# Patient Record
Sex: MALE | Race: WHITE | NOT HISPANIC OR LATINO | URBAN - METROPOLITAN AREA
[De-identification: names, ages, dates, MRNs, and addresses within clinical notes are randomized per-mention and may not be internally consistent; named-entity substitution may affect disease eponyms.]

---

## 2017-10-26 PROBLEM — Z00.00 ENCOUNTER FOR PREVENTIVE HEALTH EXAMINATION: Status: ACTIVE | Noted: 2017-10-26

## 2020-06-12 ENCOUNTER — EMERGENCY (EMERGENCY)
Facility: HOSPITAL | Age: 75
LOS: 1 days | Discharge: HOME | End: 2020-06-12
Attending: EMERGENCY MEDICINE | Admitting: EMERGENCY MEDICINE
Payer: MEDICARE

## 2020-06-12 ENCOUNTER — EMERGENCY (EMERGENCY)
Facility: HOSPITAL | Age: 75
LOS: 1 days | Discharge: HOME | End: 2020-06-12
Attending: INTERNAL MEDICINE | Admitting: INTERNAL MEDICINE
Payer: MEDICARE

## 2020-06-12 VITALS
DIASTOLIC BLOOD PRESSURE: 87 MMHG | RESPIRATION RATE: 18 BRPM | SYSTOLIC BLOOD PRESSURE: 136 MMHG | TEMPERATURE: 98 F | HEART RATE: 88 BPM | OXYGEN SATURATION: 97 % | WEIGHT: 218.04 LBS | HEIGHT: 72 IN

## 2020-06-12 VITALS
HEIGHT: 72 IN | WEIGHT: 218.04 LBS | SYSTOLIC BLOOD PRESSURE: 134 MMHG | HEART RATE: 81 BPM | DIASTOLIC BLOOD PRESSURE: 86 MMHG | TEMPERATURE: 99 F | OXYGEN SATURATION: 96 % | RESPIRATION RATE: 18 BRPM

## 2020-06-12 DIAGNOSIS — R33.9 RETENTION OF URINE, UNSPECIFIED: ICD-10-CM

## 2020-06-12 LAB
APPEARANCE UR: CLEAR — SIGNIFICANT CHANGE UP
BACTERIA # UR AUTO: NEGATIVE /HPF — SIGNIFICANT CHANGE UP
BILIRUB UR-MCNC: NEGATIVE — SIGNIFICANT CHANGE UP
COLOR SPEC: YELLOW — SIGNIFICANT CHANGE UP
DIFF PNL FLD: ABNORMAL
EPI CELLS # UR: SIGNIFICANT CHANGE UP
GLUCOSE UR QL: NEGATIVE — SIGNIFICANT CHANGE UP
KETONES UR-MCNC: NEGATIVE — SIGNIFICANT CHANGE UP
LEUKOCYTE ESTERASE UR-ACNC: NEGATIVE — SIGNIFICANT CHANGE UP
NITRITE UR-MCNC: NEGATIVE — SIGNIFICANT CHANGE UP
PH UR: 6 — SIGNIFICANT CHANGE UP (ref 5–8)
PROT UR-MCNC: 15
RBC CASTS # UR COMP ASSIST: SIGNIFICANT CHANGE UP /HPF (ref 0–4)
SP GR SPEC: 1.01 — SIGNIFICANT CHANGE UP (ref 1.01–1.02)
UROBILINOGEN FLD QL: NEGATIVE — SIGNIFICANT CHANGE UP
WBC UR QL: NEGATIVE /HPF — SIGNIFICANT CHANGE UP (ref 0–5)

## 2020-06-12 PROCEDURE — 99283 EMERGENCY DEPT VISIT LOW MDM: CPT

## 2020-06-12 PROCEDURE — 81001 URINALYSIS AUTO W/SCOPE: CPT

## 2020-06-12 PROCEDURE — 99282 EMERGENCY DEPT VISIT SF MDM: CPT

## 2020-06-12 PROCEDURE — 51702 INSERT TEMP BLADDER CATH: CPT

## 2020-06-12 PROCEDURE — 99283 EMERGENCY DEPT VISIT LOW MDM: CPT | Mod: 25

## 2020-06-12 PROCEDURE — 99284 EMERGENCY DEPT VISIT MOD MDM: CPT | Mod: 25

## 2020-06-12 PROCEDURE — 87086 URINE CULTURE/COLONY COUNT: CPT

## 2020-06-12 RX ORDER — CEFUROXIME AXETIL 250 MG
500 TABLET ORAL ONCE
Refills: 0 | Status: COMPLETED | OUTPATIENT
Start: 2020-06-12 | End: 2020-06-12

## 2020-06-12 RX ADMIN — Medication 500 MILLIGRAM(S): at 08:48

## 2020-06-12 NOTE — ED ADULT NURSE NOTE - CHIEF COMPLAINT
Detail Level: Simple Price (Do Not Change): 0.00 Instructions: This plan will send the code FBSE to the PM system.  DO NOT or CHANGE the price. The patient is a 75y Male complaining of urinary/bladder trouble.

## 2020-06-12 NOTE — ED PROVIDER NOTE - OBJECTIVE STATEMENT
pt c/o discomfort in penis all day today, moderate, after having hogue placed this morning for urinary retention after hernia surgery yesterday.  pt feels pulling on penis.  has + output from hogue. no fever. no hematuria

## 2020-06-12 NOTE — ED PROVIDER NOTE - CLINICAL SUMMARY MEDICAL DECISION MAKING FREE TEXT BOX
penile pain /discomfort since hogue placed this morning.  hogue attached to leg clip tight, no slack.  readjusted position of hogue, given slack, pt feels much better. f/u urology

## 2020-06-12 NOTE — ED PROVIDER NOTE - NSFOLLOWUPINSTRUCTIONS_ED_ALL_ED_FT
Rest, drink plenty of fluids  Advance activity as tolerated  Continue all previously prescribed medications as directed  Follow up with your PMD 2-3 days- bring copies of your results  Return to the ER for worsening  foly care

## 2020-06-12 NOTE — ED PROVIDER NOTE - CHPI ED SYMPTOMS NEG
no blood in stool/no dysuria/no hematuria/no vomiting/no diarrhea/no fever/no abdominal distension/no burning urination/no chills/no nausea

## 2020-06-12 NOTE — ED PROVIDER NOTE - PENIS
hogue in penis, in place. penis normal, no swelling/discoloration.  hogue attached to leg clip, with no slack, tugging on penis.

## 2020-06-12 NOTE — ED PROVIDER NOTE - NSFOLLOWUPINSTRUCTIONS_ED_ALL_ED_FT
Mares Catheter Placement and Care    WHAT YOU NEED TO KNOW:    A Mares catheter is a sterile tube that is inserted into your bladder to drain urine. It is also called an indwelling urinary catheter. The tip of the catheter has a small balloon filled with solution that holds the catheter inside your bladder.              DISCHARGE INSTRUCTIONS:    Return to the emergency department if:     Your catheter comes out.       You suddenly have material that looks like sand in the tubing or drainage bag.      No urine is draining into the bag and you have checked the system.      You have pain in your hip, back, pelvis, or lower abdomen.      You are confused or cannot think clearly.    Call your doctor or urologist if:     You have a fever.      You have bladder spasms for more than 1 day after the catheter is placed.      You see blood in the tubing or drainage bag.      You have a rash or itching where the catheter tube is secured to your skin.      Urine leaks from or around the catheter, tubing, or drainage bag.      The closed drainage system has accidently come open or apart.       You see a layer of crystals inside the tubing.      You have questions or concerns about your condition or care.    Care for your Mares catheter:     Clean your genital area 2 times every day.Clean your catheter and the area around where it was inserted. Use soap and water. Clean your anal opening and catheter area after every bowel movement.       Secure the catheter tube so you do not pull or move the catheter. This helps prevent pain and bladder spasms. Healthcare providers will show you how to use medical tape or a strap to secure the catheter tube to your body.       Keep a closed drainage system. Your Mares catheter should always be attached to the drainage bag to form a closed system. Do not disconnect any part of the closed system unless you need to change the bag.    Care for your drainage bag:     Ask if a leg bag is right for you. A leg bag can be worn under your clothes. Ask your healthcare provider for more information about a leg bag.       Keep the drainage bag below the level of your waist. This helps stop urine from moving back up the tubing and into your bladder. Do not loop or kink the tubing. This can cause urine to back up and collect in your bladder. Do not let the drainage bag touch or lie on the floor.      Empty the drainage bag when needed. The weight of a full drainage bag can be painful. Empty the drainage bag every 3 to 6 hours or when it is ? full.       Clean and change the drainage bag as directed. Ask your healthcare provider how often you should change the drainage bag and what cleaning solution to use. Wear disposable gloves when you change the bag. Do not allow the end of the catheter or tubing to touch anything. Clean the ends with an alcohol pad before you reconnect them.    What to do if problems develop:     No urine is draining into the bag:   Check for kinks in the tubing and straighten them out.       Check the tape or strap used to secure the catheter tube to your skin. Make sure it is not blocking the tube.       Make sure you are not sitting or lying on the tubing.      Make sure the urine bag is hanging below the level of your waist.      Urine leaks from or around the catheter, tubing, or drainage bag: Check if the closed drainage system has accidently come open or apart. Clean the catheter and tubing ends with a new alcohol pad and reconnect them.     Prevent an infection:     Wash your hands often. Wash before and after you touch your catheter, tubing, or drainage bag. Use soap and water. Wear clean disposable gloves when you care for your catheter or disconnect the drainage bag. Wash your hands before you prepare or eat food. Handwashing           Drink liquids as directed. Ask your healthcare provider how much liquid to drink each day and which liquids are best for you. Liquids will help flush your kidneys and bladder to help prevent infection.    Follow up with your doctor or urologist as directed: Write down your questions so you remember to ask them during your visits.

## 2020-06-12 NOTE — ED ADULT TRIAGE NOTE - CHIEF COMPLAINT QUOTE
Pt had urinary catheter placed today, states it is leaking and "pulling" when he tries to sit or lay down.

## 2020-06-12 NOTE — ED PROVIDER NOTE - CARE PROVIDER_API CALL
Talib Patel  UROLOGY  [address] [place] [address] [address]  [place], [place] [id number]  Phone: [phone]  Fax: [phone]  Follow Up Time: 1-3 Days

## 2020-06-12 NOTE — ED ADULT NURSE NOTE - CHIEF COMPLAINT QUOTE
"I can't urinate". Pt states he had a hernia repair yesterday with Dr. Bueno and can't urinate since last night. PMHX: Inguinal Hernia

## 2020-06-12 NOTE — ED ADULT NURSE NOTE - OBJECTIVE STATEMENT
Patient c/o urinary retention x 1 day, recently hernia surgery. Denies blood in urine, flank pain or fever.

## 2020-06-12 NOTE — ED PROVIDER NOTE - PATIENT PORTAL LINK FT
You can access the FollowMyHealth Patient Portal offered by [facility] by registering at the following website: [email or web address]. By joining [facility]’s FollowMyHealth portal, you will also be able to view your health information using other applications (apps) compatible with our system.

## 2020-06-12 NOTE — ED PROVIDER NOTE - PATIENT PORTAL LINK FT
You can access the FollowMyHealth Patient Portal offered by [facility] by registering at the following website: [email or web address]. By joining [facility]’s FollowMyHealth portal, you will also be able to view your health information using other applications (apps) compatible with our system.

## 2020-06-12 NOTE — ED ADULT NURSE NOTE - OBJECTIVE STATEMENT
Patient presents to the ED c/o penile pain. Patient was here earlier for urinary retention following a hernia repair surgery. A hogue was placed and patient released with instructions for urology consult. Patient now presents stating when he sits down the hogue catheter is pulling. I assessed patient's stat lock and re-adjusted position with new stat lock. Patient now verbalizes comfort when sitting down and offers no further complaints.

## 2020-06-12 NOTE — ED ADULT TRIAGE NOTE - CHIEF COMPLAINT QUOTE
"I can't urinate". Pt states he had a hernia repair yesterday and can't urinate since last night. "I can't urinate". Pt states he had a hernia repair yesterday with Dr. Bueno and can't urinate since last night. PMHX: Inguinal Hernia

## 2020-06-13 LAB
CULTURE RESULTS: NO GROWTH — SIGNIFICANT CHANGE UP
SPECIMEN SOURCE: SIGNIFICANT CHANGE UP

## 2021-01-05 NOTE — ED ADULT NURSE NOTE - NS ED NURSE RECORD ANOTHER HT AND WT
[0] : 2) Feeling down, depressed, or hopeless: Not at all (0) [Yes] : Yes [4 or more  times a week (4 pts)] : 4 or more  times a week (4 points) [7 to 9 (3 pts)] : 7 to 9 (3  points) [Daily or almost daily (4 pts)] : Daily or almost daily (4 points) [Audit-CScore] : 11 Yes

## 2021-03-29 ENCOUNTER — TRANSCRIPTION ENCOUNTER (OUTPATIENT)
Age: 76
End: 2021-03-29

## 2021-05-21 ENCOUNTER — TRANSCRIPTION ENCOUNTER (OUTPATIENT)
Age: 76
End: 2021-05-21

## 2021-07-20 ENCOUNTER — TRANSCRIPTION ENCOUNTER (OUTPATIENT)
Age: 76
End: 2021-07-20

## 2021-10-09 ENCOUNTER — TRANSCRIPTION ENCOUNTER (OUTPATIENT)
Age: 76
End: 2021-10-09

## 2023-04-25 PROBLEM — I10 ESSENTIAL (PRIMARY) HYPERTENSION: Chronic | Status: ACTIVE | Noted: 2020-06-12

## 2023-04-25 PROBLEM — N41.1 CHRONIC PROSTATITIS: Chronic | Status: ACTIVE | Noted: 2020-06-12

## 2023-04-27 ENCOUNTER — APPOINTMENT (OUTPATIENT)
Dept: OTOLARYNGOLOGY | Facility: CLINIC | Age: 78
End: 2023-04-27
Payer: MEDICARE

## 2023-04-27 VITALS
DIASTOLIC BLOOD PRESSURE: 80 MMHG | HEIGHT: 74 IN | TEMPERATURE: 97.1 F | BODY MASS INDEX: 27.08 KG/M2 | WEIGHT: 211 LBS | SYSTOLIC BLOOD PRESSURE: 122 MMHG | OXYGEN SATURATION: 98 % | HEART RATE: 64 BPM

## 2023-04-27 PROCEDURE — 99202 OFFICE O/P NEW SF 15 MIN: CPT

## 2023-04-27 NOTE — HISTORY OF PRESENT ILLNESS
[de-identified] : 78 year old male with bilateral clogged ears - fullness sensation. Denies otorrhea or pain but has muffled hearing. Has not happened before. Is a swimmer.

## 2023-04-27 NOTE — ASSESSMENT
[FreeTextEntry1] : 78 year old male with bilateraly clogged ear sensation - s/p cerumen removal. Return as needed.

## 2023-07-26 NOTE — ED PROVIDER NOTE - NS ED MD DISPO DISCHARGE
Shelli, RN  [phone]-  home care   Patient fell in the bathroom however and refused to go to the ER. He does have bruising, however the nurse checked his vitals and they were all normal. If you have any questions , her number is listed.      Thanks   
Home

## 2024-04-17 NOTE — ED ADULT TRIAGE NOTE - PAIN RATING/NUMBER SCALE (0-10): REST
[facility]    Brief Operative Note    Pre-operative diagnosis: Diverticulitis [K57.92]  Post-operative diagnosis Same as pre-operative diagnosis    Procedure: Robotic assisted sigmoid colectomy, mobilization of splenic flexor, N/A - Abdomen    Surgeon: Surgeons and Role:     * Alejandra Cruz MD - Primary     * Minesh Doherty PA-C - Assisting  Anesthesia: General   Estimated Blood Loss: 200 mL from 4/17/2024 12:10 PM to 4/17/2024  5:49 PM      Drains: None  Specimens:   ID Type Source Tests Collected by Time Destination   1 : Sigmoid/Descending Colon Tissue Large Intestine, Colon, Descending/Sigmoid SURGICAL PATHOLOGY EXAM Alejandra Cruz MD 4/17/2024  4:12 PM      Findings:   Significantly inflamed sigmoid and descending colon with pericolonic fluid collection .Small bowel adhesions without clear fistula   Complications: none .  Implants: * No implants in log *      ADDENDUM:    PATIENT DATA  Indicate Y or N:  Home O2 N  Hemodialysis N  Transplant patient N  Cirrhosis N  Steroids in last 30 days N  Immunomodulators in last 30 days N  Anticoagulation at time of surgery N   List medication   Prior abdominal surgery N  Pelvic irradiation N    Albumin within 30 days if known   Hgb within 30 days if known 14.9  Cr within 30 days if known 0.79    OR DATA  Emergent N   <24 hours N   <1 week N  Bowel Prep (Y or N) Y  Antibiotics (Y or N) Y  DVT prophylaxis    Heparin N   SCD Y   None N  Drain N  ASA (1,2,3,4) 2  OR time (min) 288  Stents N  Transfuse >/= 2U N  Anastomosis   Stapled Y   Handsewn N  Leak Test    Positive N   Negative Y   Not done N    
5